# Patient Record
Sex: MALE | Race: BLACK OR AFRICAN AMERICAN | NOT HISPANIC OR LATINO | URBAN - METROPOLITAN AREA
[De-identification: names, ages, dates, MRNs, and addresses within clinical notes are randomized per-mention and may not be internally consistent; named-entity substitution may affect disease eponyms.]

---

## 2024-03-14 NOTE — ASU PATIENT PROFILE, ADULT - FALL HARM RISK - UNIVERSAL INTERVENTIONS
Bed in lowest position, wheels locked, appropriate side rails in place/Call bell, personal items and telephone in reach/Instruct patient to call for assistance before getting out of bed or chair/Non-slip footwear when patient is out of bed/[place] to call system/Physically safe environment - no spills, clutter or unnecessary equipment/Purposeful Proactive Rounding/Room/bathroom lighting operational, light cord in reach

## 2024-03-14 NOTE — ASU PATIENT PROFILE, ADULT - NS PREOP UNDERSTANDS INFO
No solid food for 8 hours before surgery/ no chewing gums or hard candy / no lotions perfume/ jewelry or make up/ no body piercings/yes

## 2024-03-14 NOTE — ASU PATIENT PROFILE, ADULT - NS TRANSFER PATIENT BELONGINGS
laptop, wallet, 2 cell phones/Wrist Watch/Cell Phone/PDA (specify)/Electronic Device (specify)/Jewelry/Money (specify)/Clothing

## 2024-03-14 NOTE — ASU PATIENT PROFILE, ADULT - NSICDXPASTSURGICALHX_GEN_ALL_CORE_FT
PAST SURGICAL HISTORY:  History of throat surgery     S/P foot surgery     S/P shoulder surgery

## 2024-03-14 NOTE — ASU PATIENT PROFILE, ADULT - FALL HARM RISK - PT AGE POPULATION HIDDEN
Chief Complaint  Follow-up, Coronary Artery Disease, and Hyperlipidemia    Subjective        History of Present Illness  Elbert Reis presents to [facility] CARDIOLOGY   Mr. Reis is a 50-year-old male patient coming in for routine follow-up of coronary artery disease and hyperlipidemia.  He has no complaints of chest pains or shortness of breath.  States he notes occasionally will have some very mild diarrhea following his injection with Repatha for 1 to 2 days, however it resolves afterwards.  He denies any other side effects or concerns related to the Repatha.   Reports he is doing well, he and his wife recently have custody of 3 nieces and nephews, and reports having the children in the house he is more physically active.      Past History:     1) Coronary artery disease with initial presentation of unstable angina in December 2016. Cardiac catheterization revealed critical stenosis of the distal right coronary artery. He is status post angioplasty and drug-eluting stent placement; 2) Hyperlipidemia with inability to take statins due to elevated liver enzymes; 3) Negative for diabetes mellitus.     Past Medical History:   Diagnosis Date   • Coronary artery disease    • Hyperlipidemia    • Myocardial infarction        No Known Allergies     History reviewed. No pertinent surgical history.     Social History  He  reports that he has never smoked. He has never used smokeless tobacco. He reports that he does not currently use alcohol. He reports that he does not use drugs.    Family History  His Family history is unknown by patient.       Current Outpatient Medications on File Prior to Visit   Medication Sig   • aspirin 81 MG EC tablet Take 1 tablet by mouth Daily.   • Turmeric 500 MG capsule Take 1 capsule by mouth 2 (Two) Times a Day.   • Evolocumab (Repatha SureClick) solution auto-injector SureClick injection Inject 1 mL under the skin into the appropriate area as directed Every 14 (Fourteen)  "Days.   • coenzyme Q10 100 MG capsule Take 1 capsule by mouth Daily.   • [DISCONTINUED] loratadine (CLARITIN) 10 MG tablet Take 10 mg by mouth Daily.     No current facility-administered medications on file prior to visit.         Review of Systems   Constitutional: Negative for activity change, chills and fatigue.   Respiratory: Negative for cough, chest tightness and shortness of breath.    Cardiovascular: Negative for chest pain, palpitations and leg swelling.   Gastrointestinal: Negative for nausea and vomiting.   Skin: Negative for rash.   Neurological: Negative for dizziness, syncope and light-headedness.   Hematological: Does not bruise/bleed easily.        Objective   Vitals:    04/17/23 1452   BP: 129/91   Pulse: 102   Weight: 98 kg (216 lb)   Height: 167.6 cm (66\")         Physical Exam  General : Alert, awake, no acute distress  Neck : Supple, no carotid bruit, no jugular venous distention  CVS : Regular rate and rhythm, no murmur, rubs or gallops  Lungs: Clear to auscultation bilaterally, no crackles or rhonchi  Abdomen: Soft, nontender, bowel sounds active  Extremities: Warm, well-perfused, no pedal edema      Result Review     The following data was reviewed by ARSEN May  No results found for: PROBNP  CMP        4/14/2023    11:58   CMP   Glucose 92     BUN 11     Creatinine 1.10     EGFR 81.8     Sodium 137     Potassium 4.1     Chloride 103     Calcium 8.9     Total Protein 7.2     Albumin 4.4     Globulin 2.8     Total Bilirubin 0.3     Alkaline Phosphatase 69     AST (SGOT) 22     ALT (SGPT) 29     Albumin/Globulin Ratio 1.6     BUN/Creatinine Ratio 10.0     Anion Gap 11.0              Lipid Panel        4/14/2023    11:58   Lipid Panel   Total Cholesterol 156     Triglycerides 396     HDL Cholesterol 36     VLDL Cholesterol 61     LDL Cholesterol  59     LDL/HDL Ratio 1.13            Assessment and Plan   Diagnoses and all orders for this visit:    1. Coronary artery disease involving " native coronary artery of native heart without angina pectoris (Primary)  Assessment & Plan:  Stable, he has no symptoms of angina.  Continue daily aspirin, and current cholesterol regiment, unable to tolerate statins.    Orders:  -     Comprehensive Metabolic Panel; Future    2. Mixed hyperlipidemia  Assessment & Plan:  LDL is now at goal at 59.  His triglycerides remain elevated at 396, however in the past he has had levels in the 7967-2792 range.  Discussed other medication therapies, prefers not to start an additional medication.  Recommend to continue coenzyme q10, and Repatha, and work on dietary changes.  Avoid statins, and significant liver enzyme increase with treatment.    Orders:  -     Evolocumab (Repatha SureClick) solution auto-injector SureClick injection; Inject 1 mL under the skin into the appropriate area as directed Every 14 (Fourteen) Days.  Dispense: 6 mL; Refill: 3  -     Comprehensive Metabolic Panel; Future  -     Lipid Panel; Future          Follow Up   Return in 9 months (on 1/17/2024) for with Dr. Haines.    Patient was given instructions and counseling regarding his condition or for health maintenance advice. Please see specific information pulled into the AVS if appropriate.     Signed,  Melisa Branham, APRN  04/17/2023     Dictated Utilizing Dragon Dictation: Please note that portions of this note were completed with a voice recognition program.  Part of this note may be an electronic transcription/translation of spoken language to printed text using the Dragon Dictation System.     Adult

## 2024-03-15 ENCOUNTER — OUTPATIENT (OUTPATIENT)
Dept: OUTPATIENT SERVICES | Facility: HOSPITAL | Age: 49
LOS: 1 days | Discharge: HOME | End: 2024-03-15

## 2024-03-15 VITALS
HEIGHT: 75 IN | WEIGHT: 176.37 LBS | HEART RATE: 74 BPM | RESPIRATION RATE: 16 BRPM | DIASTOLIC BLOOD PRESSURE: 66 MMHG | OXYGEN SATURATION: 96 % | SYSTOLIC BLOOD PRESSURE: 113 MMHG | TEMPERATURE: 98 F

## 2024-03-15 VITALS
TEMPERATURE: 98 F | SYSTOLIC BLOOD PRESSURE: 126 MMHG | HEART RATE: 60 BPM | RESPIRATION RATE: 16 BRPM | OXYGEN SATURATION: 98 % | DIASTOLIC BLOOD PRESSURE: 84 MMHG

## 2024-03-15 DIAGNOSIS — Z98.890 OTHER SPECIFIED POSTPROCEDURAL STATES: Chronic | ICD-10-CM

## 2024-03-15 LAB — GLUCOSE BLDC GLUCOMTR-MCNC: 133 MG/DL — HIGH (ref 70–99)

## 2024-03-15 RX ORDER — ACETAMINOPHEN 500 MG
650 TABLET ORAL ONCE
Refills: 0 | Status: COMPLETED | OUTPATIENT
Start: 2024-03-15 | End: 2024-03-15

## 2024-03-15 RX ORDER — CALCIUM CHLORIDE, MAGNESIUM CHLORIDE, POTASSIUM CHLORIDE, SODIUM ACETATE, SODIUM CHLORIDE, SODIUM CITRATE .48; .3; .75; 3.9; 6.4; 1.7 MG/ML; MG/ML; MG/ML; MG/ML; MG/ML; MG/ML
1 SOLUTION OPHTHALMIC ONCE
Refills: 0 | Status: DISCONTINUED | OUTPATIENT
Start: 2024-03-15 | End: 2024-03-15

## 2024-03-15 RX ORDER — OFLOXACIN 0.3 %
1 DROPS OPHTHALMIC (EYE) ONCE
Refills: 0 | Status: DISCONTINUED | OUTPATIENT
Start: 2024-03-15 | End: 2024-03-15

## 2024-03-15 RX ORDER — SODIUM CHLORIDE 9 MG/ML
500 INJECTION, SOLUTION INTRAVENOUS
Refills: 0 | Status: DISCONTINUED | OUTPATIENT
Start: 2024-03-15 | End: 2024-03-15

## 2024-03-15 RX ORDER — KETOROLAC TROMETHAMINE 30 MG/ML
1 SYRINGE (ML) INJECTION
Qty: 16 | Refills: 0
Start: 2024-03-15 | End: 2024-03-18

## 2024-03-15 RX ORDER — ONDANSETRON 8 MG/1
4 TABLET, FILM COATED ORAL ONCE
Refills: 0 | Status: DISCONTINUED | OUTPATIENT
Start: 2024-03-15 | End: 2024-03-15

## 2024-03-15 RX ORDER — CALCIUM CHLORIDE, MAGNESIUM CHLORIDE, POTASSIUM CHLORIDE, SODIUM ACETATE, SODIUM CHLORIDE, SODIUM CITRATE .48; .3; .75; 3.9; 6.4; 1.7 MG/ML; MG/ML; MG/ML; MG/ML; MG/ML; MG/ML
1 SOLUTION OPHTHALMIC ONCE
Refills: 0 | Status: COMPLETED | OUTPATIENT
Start: 2024-03-15 | End: 2024-03-15

## 2024-03-15 RX ADMIN — Medication 650 MILLIGRAM(S): at 10:46

## 2024-03-15 RX ADMIN — CALCIUM CHLORIDE, MAGNESIUM CHLORIDE, POTASSIUM CHLORIDE, SODIUM ACETATE, SODIUM CHLORIDE, SODIUM CITRATE 1 APPLICATION(S): .48; .3; .75; 3.9; 6.4; 1.7 SOLUTION OPHTHALMIC at 10:47

## 2024-03-15 RX ADMIN — Medication 1 DROP(S): at 12:06

## 2024-03-15 RX ADMIN — Medication 650 MILLIGRAM(S): at 11:16

## 2024-03-15 RX ADMIN — Medication 1 DROP(S): at 11:02

## 2024-03-15 NOTE — CHART NOTE - NSCHARTNOTEFT_GEN_A_CORE
Pt s/p surgery, c/o right eye pain and irritation while in recovery room. States "feels like there is glass in my eyelid". Eye tearing and red. Flushed with BSS with no relief; 1 gtt tetracaine ordered. Pt had relief for 30 minutes post tetracaine, but pain and irritation returned. Ordered one additional drop of tetracaine to be given and ophthamology consult placed @ 1200. Awaiting follow up with ophthalmology.

## 2024-03-15 NOTE — ASU DISCHARGE PLAN (ADULT/PEDIATRIC) - CARE PROVIDER_API CALL
Julio Pineda  Colon/Rectal Surgery  [address] [place] [address], [address]  [place] [address]  Phone: [phone]  Fax: [phone]  Follow Up Time: 2 weeks

## 2024-06-20 NOTE — ASU PATIENT PROFILE, ADULT - NS PREOP UNDERSTANDS INFO
no solid food for 8 hours before surgery/ no lotions/ perfume/ jewelry or  make up. no chewing gums or hard candy while NPO. wear loose comfortable fitting clothes/yes
0447

## 2024-06-20 NOTE — ASU PATIENT PROFILE, ADULT - NSICDXPASTSURGICALHX_GEN_ALL_CORE_FT
PAST SURGICAL HISTORY:  History of throat surgery     S/P foot surgery     S/P shoulder surgery      PAST SURGICAL HISTORY:  H/O colonoscopy 5/2024    History of rectal surgery seton placement 3/2024 MEET    History of throat surgery     S/P foot surgery ankle    S/P shoulder surgery right

## 2024-06-20 NOTE — ASU PATIENT PROFILE, ADULT - NSICDXPASTMEDICALHX_GEN_ALL_CORE_FT
PAST MEDICAL HISTORY:  Diabetes mellitus     HIV disease      PAST MEDICAL HISTORY:  Diabetes mellitus     EBV infection     Hepatitis B     HIV disease

## 2024-06-21 ENCOUNTER — OUTPATIENT (OUTPATIENT)
Dept: OUTPATIENT SERVICES | Facility: HOSPITAL | Age: 49
LOS: 1 days | Discharge: HOME | End: 2024-06-21

## 2024-06-21 VITALS
TEMPERATURE: 98 F | WEIGHT: 171.52 LBS | RESPIRATION RATE: 16 BRPM | DIASTOLIC BLOOD PRESSURE: 68 MMHG | HEIGHT: 75 IN | HEART RATE: 54 BPM | SYSTOLIC BLOOD PRESSURE: 134 MMHG | OXYGEN SATURATION: 98 %

## 2024-06-21 VITALS
TEMPERATURE: 99 F | HEART RATE: 75 BPM | SYSTOLIC BLOOD PRESSURE: 135 MMHG | OXYGEN SATURATION: 99 % | RESPIRATION RATE: 16 BRPM | DIASTOLIC BLOOD PRESSURE: 80 MMHG

## 2024-06-21 DIAGNOSIS — Z98.890 OTHER SPECIFIED POSTPROCEDURAL STATES: Chronic | ICD-10-CM

## 2024-06-21 LAB — GLUCOSE BLDC GLUCOMTR-MCNC: 129 MG/DL — HIGH (ref 70–99)

## 2024-06-21 PROCEDURE — 88304 TISSUE EXAM BY PATHOLOGIST: CPT | Mod: 26

## 2024-06-21 RX ORDER — BICTEGRAVIR SODIUM, EMTRICITABINE, AND TENOFOVIR ALAFENAMIDE FUMARATE 30; 120; 15 MG/1; MG/1; MG/1
1 TABLET ORAL
Refills: 0 | DISCHARGE

## 2024-06-21 RX ORDER — SODIUM CHLORIDE 9 MG/ML
1000 INJECTION, SOLUTION INTRAVENOUS
Refills: 0 | Status: DISCONTINUED | OUTPATIENT
Start: 2024-06-21 | End: 2024-06-21

## 2024-06-21 RX ORDER — ONDANSETRON 8 MG/1
4 TABLET, FILM COATED ORAL ONCE
Refills: 0 | Status: DISCONTINUED | OUTPATIENT
Start: 2024-06-21 | End: 2024-06-21

## 2024-06-21 RX ORDER — ACETAMINOPHEN 500 MG
1 TABLET ORAL
Refills: 0 | DISCHARGE

## 2024-06-21 RX ORDER — ACETAMINOPHEN 500 MG
650 TABLET ORAL ONCE
Refills: 0 | Status: COMPLETED | OUTPATIENT
Start: 2024-06-21 | End: 2024-06-21

## 2024-06-21 RX ORDER — FENTANYL CITRATE 50 UG/ML
50 INJECTION INTRAVENOUS ONCE
Refills: 0 | Status: DISCONTINUED | OUTPATIENT
Start: 2024-06-21 | End: 2024-06-21

## 2024-06-21 RX ORDER — METFORMIN HYDROCHLORIDE 850 MG/1
1 TABLET ORAL
Refills: 0 | DISCHARGE

## 2024-06-21 RX ADMIN — FENTANYL CITRATE 50 MICROGRAM(S): 50 INJECTION INTRAVENOUS at 14:34

## 2024-06-21 RX ADMIN — Medication 650 MILLIGRAM(S): at 14:34

## 2024-06-21 NOTE — BRIEF OPERATIVE NOTE - OPERATION/FINDINGS
Pt places in the prone and adalid knife position under general anesthesia.   Previous seton found and removed, fistula tract identified with probe. Incision in the intersphincteric groove made and blunt dissection to the fistula tract made. tract suture ligated with 4 2-0 Vycril sutures. superficual aspect of the fistula tract excised and sent for culture. hemostasis achieved and pt flipped and brought to PACU without issue.

## 2024-06-21 NOTE — BRIEF OPERATIVE NOTE - NSICDXBRIEFPROCEDURE_GEN_ALL_CORE_FT
PROCEDURES:  Complex fistulotomy with ligation of intersphincteric fistula tract (LIFT) 21-Jun-2024 14:16:17  Donnie Chavarria  Removal of anal seton 21-Jun-2024 14:16:46  Donnie Chavarria

## 2024-06-21 NOTE — ASU DISCHARGE PLAN (ADULT/PEDIATRIC) - CARE PROVIDER_API CALL
Julio Pineda  Colon/Rectal Surgery  [address] [place] [address], [address]  [place] [address]  Phone: [phone]  Fax: [phone]  Follow Up Time: 1 month

## 2024-06-21 NOTE — ASU DISCHARGE PLAN (ADULT/PEDIATRIC) - NS MD DC FALL RISK RISK
Nurse-flu booster, varicella For information on Fall & Injury Prevention, visit: [email or web address] OR  [email or web address] OR  https://www.cdc.gov/steadi/patient.html

## 2024-06-21 NOTE — ASU PREOP CHECKLIST - NS PREOP CHK MONITOR ANESTHESIA CONSENT
----- Message from Freddie Blount sent at 12/22/2020 11:35 AM CST -----  Regarding: pt mom  Would like a call from nurse in regards to his rash. Please call back at .[phone] (home)         Thank You ,   Freddie Blount    
Called, no answer, lmom to return call.   
dos

## 2024-06-21 NOTE — ASU DISCHARGE PLAN (ADULT/PEDIATRIC) - PATIENT BELONGINGS
Hub is instructed to read the documentation below to patient    The refill request for Xulane 150-35 mcg/24hr was sent to the provider on the day of request (5/3). Steve is not in the office on Wednesdays and he is out sick today (5/4). He will review this request as soon as he is able to.   Patient's belongings returned

## 2024-07-02 LAB — SURGICAL PATHOLOGY STUDY: SIGNIFICANT CHANGE UP

## (undated) DEVICE — PACK COLO RECTAL

## (undated) DEVICE — VENODYNE/SCD SLEEVE CALF MEDIUM

## (undated) DEVICE — SUT VICRYL 2-0 27" SH

## (undated) DEVICE — WARMING BLANKET UPPER ADULT

## (undated) DEVICE — GLV 7 PROTEXIS (WHITE)

## (undated) DEVICE — SUT VICRYL 3-0 18" SH UNDYED (POP-OFF)

## (undated) DEVICE — GLV 7.5 PROTEXIS (WHITE)

## (undated) DEVICE — GLV 6 PROTEXIS (WHITE)

## (undated) DEVICE — ELCTR BOVIE PENCIL SMOKE EVACUATION

## (undated) DEVICE — SLV COMPRESSION KNEE MED

## (undated) DEVICE — BLADE SURGICAL #11 CARBON

## (undated) DEVICE — TAPE SILK 3"

## (undated) DEVICE — DRSG MASTISOL

## (undated) DEVICE — VESSEL LOOP MAXI-BLUE 0.120" X 16"